# Patient Record
Sex: FEMALE | Race: WHITE | NOT HISPANIC OR LATINO | Employment: UNEMPLOYED
[De-identification: names, ages, dates, MRNs, and addresses within clinical notes are randomized per-mention and may not be internally consistent; named-entity substitution may affect disease eponyms.]

---

## 2017-01-12 RX ORDER — CLINDAMYCIN PHOSPHATE 10 MG/G
GEL TOPICAL
Qty: 30 G | Refills: 0 | OUTPATIENT
Start: 2017-01-12

## 2017-01-13 RX ORDER — CLINDAMYCIN PHOSPHATE 10 MG/G
GEL TOPICAL 2 TIMES DAILY
Qty: 60 G | Refills: 1 | Status: SHIPPED | OUTPATIENT
Start: 2017-01-13 | End: 2021-11-12 | Stop reason: ALTCHOICE

## 2017-02-27 ENCOUNTER — LAB SERVICES (OUTPATIENT)
Dept: OTHER | Age: 37
End: 2017-02-27

## 2017-02-27 ENCOUNTER — CHARTING TRANS (OUTPATIENT)
Dept: OTHER | Age: 37
End: 2017-02-27

## 2017-03-28 ENCOUNTER — CHARTING TRANS (OUTPATIENT)
Dept: OTHER | Age: 37
End: 2017-03-28

## 2017-03-29 LAB — APPEARANCE SPEC: NORMAL

## 2018-01-16 RX ORDER — CLINDAMYCIN PHOSPHATE 10 MG/G
GEL TOPICAL
Qty: 60 G | Refills: 0 | OUTPATIENT
Start: 2018-01-16

## 2018-05-03 ENCOUNTER — TELEPHONE (OUTPATIENT)
Dept: INTERNAL MEDICINE CLINIC | Facility: CLINIC | Age: 38
End: 2018-05-03

## 2018-12-01 ENCOUNTER — IMAGING SERVICES (OUTPATIENT)
Dept: OTHER | Age: 38
End: 2018-12-01

## 2019-04-17 ENCOUNTER — EXTERNAL RECORD (OUTPATIENT)
Dept: OTHER | Age: 39
End: 2019-04-17

## 2019-04-19 LAB — TEXT: NORMAL

## 2021-11-12 PROBLEM — K12.0 APHTHOUS ULCER OF TONGUE: Status: ACTIVE | Noted: 2021-11-12

## 2021-11-23 ENCOUNTER — OFFICE VISIT (OUTPATIENT)
Dept: FAMILY MEDICINE CLINIC | Facility: CLINIC | Age: 41
End: 2021-11-23
Payer: COMMERCIAL

## 2021-11-23 VITALS — TEMPERATURE: 98 F

## 2021-11-23 DIAGNOSIS — Z11.1 SCREENING FOR TUBERCULOSIS: ICD-10-CM

## 2021-11-23 DIAGNOSIS — Z23 NEED FOR VACCINATION: ICD-10-CM

## 2021-11-23 PROCEDURE — 90471 IMMUNIZATION ADMIN: CPT | Performed by: NURSE PRACTITIONER

## 2021-11-23 PROCEDURE — 90707 MMR VACCINE SC: CPT | Performed by: NURSE PRACTITIONER

## 2021-11-23 PROCEDURE — 90746 HEPB VACCINE 3 DOSE ADULT IM: CPT | Performed by: NURSE PRACTITIONER

## 2021-11-23 PROCEDURE — 90715 TDAP VACCINE 7 YRS/> IM: CPT | Performed by: NURSE PRACTITIONER

## 2021-11-23 PROCEDURE — 90472 IMMUNIZATION ADMIN EACH ADD: CPT | Performed by: NURSE PRACTITIONER

## 2021-11-23 PROCEDURE — 90686 IIV4 VACC NO PRSV 0.5 ML IM: CPT | Performed by: NURSE PRACTITIONER

## 2021-11-23 PROCEDURE — 86580 TB INTRADERMAL TEST: CPT | Performed by: NURSE PRACTITIONER

## 2021-11-23 NOTE — PATIENT INSTRUCTIONS
You will need to return to clinic in 48-72 hours to have results of TB test read. Please return to clinic on 11/26 between 8 am and 4:45 pm to have your TB test read. If you do not return in this time frame your test will need to be repeated.     Our

## 2021-11-23 NOTE — PROGRESS NOTES
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 No record of vaccines, [place]. VIS provided for all immunizations, consent given and questions answered. Patient tolerated procedure well with no adverse effects.  Advised

## 2021-11-26 ENCOUNTER — APPOINTMENT (OUTPATIENT)
Dept: FAMILY MEDICINE CLINIC | Facility: CLINIC | Age: 41
End: 2021-11-26
Payer: COMMERCIAL

## 2021-12-06 PROBLEM — K12.0 APHTHOUS ULCER OF TONGUE: Status: RESOLVED | Noted: 2021-11-12 | Resolved: 2021-12-06

## 2021-12-06 PROBLEM — Z00.00: Status: ACTIVE | Noted: 2021-12-06

## 2021-12-07 ENCOUNTER — LAB SERVICES (OUTPATIENT)
Dept: LAB | Age: 41
End: 2021-12-07

## 2021-12-07 DIAGNOSIS — Z01.84 IMMUNITY STATUS TESTING: ICD-10-CM

## 2021-12-07 PROCEDURE — 86787 VARICELLA-ZOSTER ANTIBODY: CPT | Performed by: CLINICAL MEDICAL LABORATORY

## 2021-12-07 PROCEDURE — 36415 COLL VENOUS BLD VENIPUNCTURE: CPT | Performed by: PSYCHIATRY & NEUROLOGY

## 2021-12-09 LAB — VZV IGG SER IA-ACNC: NORMAL

## 2021-12-28 ENCOUNTER — OFFICE VISIT (OUTPATIENT)
Dept: FAMILY MEDICINE CLINIC | Facility: CLINIC | Age: 41
End: 2021-12-28

## 2021-12-28 DIAGNOSIS — Z02.9 ADMINISTRATIVE ENCOUNTER: Primary | ICD-10-CM

## 2022-02-10 ENCOUNTER — LAB SERVICES (OUTPATIENT)
Dept: LAB | Age: 42
End: 2022-02-10

## 2022-02-10 DIAGNOSIS — Z11.1 SCREENING EXAMINATION FOR PULMONARY TUBERCULOSIS: ICD-10-CM

## 2022-02-10 PROCEDURE — 36415 COLL VENOUS BLD VENIPUNCTURE: CPT | Performed by: PSYCHIATRY & NEUROLOGY

## 2022-02-10 PROCEDURE — 86480 TB TEST CELL IMMUN MEASURE: CPT | Performed by: CLINICAL MEDICAL LABORATORY

## 2022-02-12 LAB
GAMMA INTERFERON BACKGROUND BLD IA-ACNC: 0.03 IU/ML
M TB IFN-G BLD-IMP: NEGATIVE
M TB IFN-G CD4+ BCKGRND COR BLD-ACNC: 0 IU/ML
M TB IFN-G CD4+CD8+ BCKGRND COR BLD-ACNC: 0 IU/ML
MITOGEN IGNF BCKGRD COR BLD-ACNC: >10 IU/ML

## 2022-03-29 PROBLEM — B35.1 DERMATOPHYTOSIS OF NAIL: Status: ACTIVE | Noted: 2022-03-29

## 2022-08-26 PROBLEM — J06.9 VIRAL UPPER RESPIRATORY TRACT INFECTION: Status: ACTIVE | Noted: 2022-08-26

## 2023-02-20 PROBLEM — R74.01 ELEVATED ALANINE AMINOTRANSFERASE (ALT) LEVEL: Status: ACTIVE | Noted: 2023-02-20

## 2023-11-07 ENCOUNTER — OFFICE VISIT (OUTPATIENT)
Dept: OCCUPATIONAL MEDICINE | Age: 43
End: 2023-11-07
Attending: PHYSICIAN ASSISTANT

## 2023-11-22 ENCOUNTER — OFFICE VISIT (OUTPATIENT)
Dept: OCCUPATIONAL MEDICINE | Age: 43
End: 2023-11-22
Attending: PHYSICIAN ASSISTANT

## 2023-11-22 DIAGNOSIS — Z01.84 IMMUNITY STATUS TESTING: Primary | ICD-10-CM

## 2023-11-22 LAB
RUBV IGG SER QL: POSITIVE
RUBV IGG SER-ACNC: 274 IU/ML (ref 10–?)

## 2023-11-22 PROCEDURE — 86480 TB TEST CELL IMMUN MEASURE: CPT

## 2023-11-22 PROCEDURE — 86765 RUBEOLA ANTIBODY: CPT

## 2023-11-22 PROCEDURE — 86735 MUMPS ANTIBODY: CPT

## 2023-11-22 PROCEDURE — 86787 VARICELLA-ZOSTER ANTIBODY: CPT

## 2023-11-22 PROCEDURE — 86762 RUBELLA ANTIBODY: CPT

## 2023-11-24 LAB
M TB IFN-G CD4+ T-CELLS BLD-ACNC: 0 IU/ML
M TB TUBERC IFN-G BLD QL: NEGATIVE
M TB TUBERC IGNF/MITOGEN IGNF CONTROL: >10 IU/ML
MEV IGG SER-ACNC: >300 AU/ML (ref 16.5–?)
MUV IGG SER IA-ACNC: 33.1 AU/ML (ref 11–?)
QFT TB1 AG MINUS NIL: 0 IU/ML
QFT TB2 AG MINUS NIL: 0 IU/ML
VZV IGG SER IA-ACNC: 391.3 (ref 165–?)

## 2023-12-26 ENCOUNTER — OFFICE VISIT (OUTPATIENT)
Dept: OCCUPATIONAL MEDICINE | Age: 43
End: 2023-12-26
Attending: PHYSICIAN ASSISTANT

## 2023-12-26 DIAGNOSIS — Z02.1 ENCOUNTER FOR PRE-EMPLOYMENT HEALTH SCREENING EXAMINATION: Primary | ICD-10-CM

## 2023-12-26 LAB
HBV SURFACE AB SER QL: REACTIVE
HBV SURFACE AB SERPL IA-ACNC: >1000 MIU/ML

## 2023-12-26 PROCEDURE — 86706 HEP B SURFACE ANTIBODY: CPT

## 2024-02-17 ENCOUNTER — HOSPITAL ENCOUNTER (OUTPATIENT)
Age: 44
Discharge: HOME | End: 2024-02-17
Payer: COMMERCIAL

## 2024-02-17 VITALS
SYSTOLIC BLOOD PRESSURE: 112 MMHG | RESPIRATION RATE: 18 BRPM | TEMPERATURE: 98 F | OXYGEN SATURATION: 100 % | DIASTOLIC BLOOD PRESSURE: 74 MMHG | HEART RATE: 47 BPM

## 2024-02-17 DIAGNOSIS — H10.31 ACUTE BACTERIAL CONJUNCTIVITIS OF RIGHT EYE: Primary | ICD-10-CM

## 2024-02-17 RX ORDER — POLYMYXIN B SULFATE AND TRIMETHOPRIM 1; 10000 MG/ML; [USP'U]/ML
1 SOLUTION OPHTHALMIC
Qty: 10 ML | Refills: 1 | Status: SHIPPED | OUTPATIENT
Start: 2024-02-17 | End: 2024-02-22

## 2024-02-17 NOTE — DISCHARGE INSTRUCTIONS
Rest    Frequent hand washing    See ophthalmologist if worsening or no improvement in 48-72 hours    Start antibiotics eye drops right away  Warm moist compresses to the eye to remove pustular drainage    Close follow-up with ophthalmology in the next 3-5 days if not better or sooner if worsening.

## 2024-02-17 NOTE — ED INITIAL ASSESSMENT (HPI)
Pt c/o right eye redness and discharge, symptoms started last night; Pt noted some symptoms in left eye this morning

## 2024-02-17 NOTE — ED PROVIDER NOTES
Patient Seen in: [facility]      History     Chief Complaint   Patient presents with    Eye Problem     Stated Complaint: Red eye    Subjective:   HPI    43-year-old female who comes in today with history of bradycardia complaining of right crusting redness and itching.  Patient states that son recently had conjunctivitis.  Patient does not wear contacts or glasses.  Patient denies visual disturbances.    Objective:   Past Medical History:   Diagnosis Date    Hospitalism     none              Past Surgical History:   Procedure Laterality Date    OTHER SURGICAL HISTORY      none                Social History     Socioeconomic History    Marital status:     Number of children: 2   Occupational History    Occupation: homemaker   Tobacco Use    Smoking status: Never    Smokeless tobacco: Never   Substance and Sexual Activity    Alcohol use: No     Alcohol/week: 0.0 standard drinks of alcohol    Drug use: No    Sexual activity: Yes     Partners: Male              Review of Systems    Positive for stated complaint: Red eye  Other systems are as noted in HPI.  Constitutional and vital signs reviewed.      All other systems reviewed and negative except as noted above.    Physical Exam     ED Triage Vitals [02/17/24 1458]   /74   Pulse (!) 43   Resp 18   Temp 98.1 °F (36.7 °C)   Temp src Temporal   SpO2 100 %   O2 Device None (Room air)       Current:/74   Pulse (!) 47   Temp 98.1 °F (36.7 °C) (Temporal)   Resp 18   SpO2 100%     Right Eye Chart Acuity: 20/20, Corrected  Left Eye Chart Acuity: 20/20, Corrected    Physical Exam      General Appearance: Alert, cooperative, no SOB or labored breathing, appropriate for age   Head: Normocephalic, without obvious abnormality   Eyes: EOMI without nystagmus. PERRLA. RIGHT eye: mild conjunctival injection.  No obvious foreign body. No surrounding erythema or edema.       Ears: Bilateral:TM clear and pearly gray color. No external auditory  canal edema or discharge. No pinna, tragus, or mastoid TTP.  Nose: Nares symmetrical, No maxillary or frontal sinus tenderness   Throat: Lips, tongue, and mucosa are moist, pink, and intact; teeth intact. No erythema, edema, exudates of posterior pharynx. Uvula midline, palate symmetrical  Neck: Supple; no lymphadenopathy   Skin:  No rashes.          ED Course   Labs Reviewed - No data to display       MDM          Medical Decision Making    43-year-old female who comes in today with history of bradycardia complaining of right crusting redness and itching.  Patient states that son recently had conjunctivitis.  Patient does not wear contacts or glasses.  Patient denies visual disturbances.    Problems Addressed:  Acute bacterial conjunctivitis of right eye: acute illness or injury    Risk  OTC drugs.  Prescription drug management.  Risk Details: Clinical diagnosis: Acute conjunctivitis    Differential diagnosis includes viral conjunctivitis, bacterial conjunctivitis, orbital cellulitis    Patient has no surrounding erythema, has purulent yellow drainage, will start on  antibiotic drops, warm compresses good handwashing, if no improvement be re-evaluated in 48 hours        Disposition and Plan     Clinical Impression:  1. Acute bacterial conjunctivitis of right eye         Disposition:  Discharge  2/17/2024  3:21 pm    Follow-up:  Hailey Alvarez MD  [address] [facility] [address]  [place] [address]  [phone]          Julienne Barton MD  [address] [place] [address]  [phone]    Schedule an appointment as soon as possible for a visit   If symptoms worsen          Medications Prescribed:  Discharge Medication List as of 2/17/2024  3:24 PM        START taking these medications    Details   polymyxin B-trimethoprim 49839-7.1 UNIT/ML-% Ophthalmic Solution Apply 1 drop to eye Q3H While Awake for 5 days., Normal, Disp-10 mL, R-1

## 2024-04-05 ENCOUNTER — APPOINTMENT (OUTPATIENT)
Dept: GENERAL RADIOLOGY | Age: 44
End: 2024-04-05
Attending: PHYSICIAN ASSISTANT

## 2024-04-05 ENCOUNTER — HOSPITAL ENCOUNTER (OUTPATIENT)
Age: 44
Discharge: HOME | End: 2024-04-05

## 2024-04-05 VITALS
TEMPERATURE: 99 F | HEART RATE: 49 BPM | RESPIRATION RATE: 16 BRPM | SYSTOLIC BLOOD PRESSURE: 90 MMHG | OXYGEN SATURATION: 100 % | WEIGHT: 101.44 LBS | DIASTOLIC BLOOD PRESSURE: 59 MMHG

## 2024-04-05 DIAGNOSIS — R07.0 THROAT PAIN: Primary | ICD-10-CM

## 2024-04-05 DIAGNOSIS — J02.9 PHARYNGITIS, UNSPECIFIED ETIOLOGY: ICD-10-CM

## 2024-04-05 LAB
#MXD IC: 0.3 X10ˆ3/UL (ref 0.1–1)
BUN BLD-MCNC: 10 MG/DL (ref 7–18)
CHLORIDE BLD-SCNC: 106 MMOL/L (ref 98–112)
CO2 BLD-SCNC: 29 MMOL/L (ref 21–32)
CREAT BLD-MCNC: 0.7 MG/DL
EGFRCR SERPLBLD CKD-EPI 2021: 111 ML/MIN/1.73M2 (ref 60–?)
GLUCOSE BLD-MCNC: 66 MG/DL (ref 70–99)
HCT VFR BLD AUTO: 35 %
HCT VFR BLD CALC: 35 %
HGB BLD-MCNC: 11.6 G/DL
ISTAT IONIZED CALCIUM FOR CHEM 8: 1.19 MMOL/L (ref 1.12–1.32)
LYMPHOCYTES # BLD AUTO: 1.7 X10ˆ3/UL (ref 1–4)
LYMPHOCYTES NFR BLD AUTO: 29.8 %
MCH RBC QN AUTO: 32.9 PG (ref 26–34)
MCHC RBC AUTO-ENTMCNC: 33.1 G/DL (ref 31–37)
MCV RBC AUTO: 99.2 FL (ref 80–100)
MIXED CELL %: 5.3 %
NEUTROPHILS # BLD AUTO: 3.6 X10ˆ3/UL (ref 1.5–7.7)
NEUTROPHILS NFR BLD AUTO: 64.9 %
PLATELET # BLD AUTO: 195 X10ˆ3/UL (ref 150–450)
POCT INFLUENZA A: NEGATIVE
POCT INFLUENZA B: NEGATIVE
POTASSIUM BLD-SCNC: 3.4 MMOL/L (ref 3.6–5.1)
RBC # BLD AUTO: 3.53 X10ˆ6/UL
S PYO AG THROAT QL: NEGATIVE
SARS-COV-2 RNA RESP QL NAA+PROBE: NOT DETECTED
SODIUM BLD-SCNC: 145 MMOL/L (ref 136–145)
TROPONIN I BLD-MCNC: <0.02 NG/ML
WBC # BLD AUTO: 5.6 X10ˆ3/UL (ref 4–11)

## 2024-04-05 PROCEDURE — U0002 COVID-19 LAB TEST NON-CDC: HCPCS | Performed by: PHYSICIAN ASSISTANT

## 2024-04-05 PROCEDURE — 84484 ASSAY OF TROPONIN QUANT: CPT | Performed by: PHYSICIAN ASSISTANT

## 2024-04-05 PROCEDURE — 80047 BASIC METABLC PNL IONIZED CA: CPT | Performed by: PHYSICIAN ASSISTANT

## 2024-04-05 PROCEDURE — 87880 STREP A ASSAY W/OPTIC: CPT | Performed by: PHYSICIAN ASSISTANT

## 2024-04-05 PROCEDURE — 93000 ELECTROCARDIOGRAM COMPLETE: CPT | Performed by: PHYSICIAN ASSISTANT

## 2024-04-05 PROCEDURE — 87502 INFLUENZA DNA AMP PROBE: CPT | Performed by: PHYSICIAN ASSISTANT

## 2024-04-05 PROCEDURE — 99214 OFFICE O/P EST MOD 30 MIN: CPT | Performed by: PHYSICIAN ASSISTANT

## 2024-04-05 PROCEDURE — 71046 X-RAY EXAM CHEST 2 VIEWS: CPT | Performed by: PHYSICIAN ASSISTANT

## 2024-04-05 PROCEDURE — 85025 COMPLETE CBC W/AUTO DIFF WBC: CPT | Performed by: PHYSICIAN ASSISTANT

## 2024-04-05 RX ORDER — FAMOTIDINE 20 MG/1
20 TABLET, FILM COATED ORAL 2 TIMES DAILY PRN
Qty: 30 TABLET | Refills: 0 | Status: SHIPPED | OUTPATIENT
Start: 2024-04-05 | End: 2024-05-05

## 2024-04-05 RX ORDER — FLUTICASONE PROPIONATE 50 MCG
2 SPRAY, SUSPENSION (ML) NASAL DAILY
Qty: 16 G | Refills: 0 | Status: SHIPPED | OUTPATIENT
Start: 2024-04-05 | End: 2024-05-05

## 2024-04-05 RX ORDER — CETIRIZINE HYDROCHLORIDE 10 MG/1
10 TABLET ORAL DAILY
Qty: 30 TABLET | Refills: 0 | Status: SHIPPED | OUTPATIENT
Start: 2024-04-05 | End: 2024-05-05

## 2024-04-05 NOTE — ED PROVIDER NOTES
Patient Seen in: [facility] [place]      History     Chief Complaint   Patient presents with    Pain     Stated Complaint: tightness in throat    Subjective:   HPI  Iraida De Paz is a 43 year old female  presents with throat pain x 5 days. Patient reports mild dysphagia to solids, non productive cough, voice hoarseness.  Tolerating oral fluids.    Patient denies ear pain, rhinorrhea, fevers, chills, chest pain/ pressure, GOMEZ, orthopnea,shortness of breath, respiratory distress, stridor, neck pain/ stiffness, headache, eye pain/ redness, facial/ lip/ eyelid swelling. No medications taken prior to arrival. No alleviating/ aggravating factors. Pain 3/10      Objective:   Past Medical History:   Diagnosis Date    Hospitalism     none              Past Surgical History:   Procedure Laterality Date    OTHER SURGICAL HISTORY      none                Social History     Socioeconomic History    Marital status:     Number of children: 2   Occupational History    Occupation: homemaker   Tobacco Use    Smoking status: Never    Smokeless tobacco: Never   Substance and Sexual Activity    Alcohol use: No     Alcohol/week: 0.0 standard drinks of alcohol    Drug use: No    Sexual activity: Yes     Partners: Male              Review of Systems   All other systems reviewed and are negative.      Positive for stated complaint: tightness in throat  Other systems are as noted in HPI.  Constitutional and vital signs reviewed.      All other systems reviewed and negative except as noted above.    Physical Exam     ED Triage Vitals [04/05/24 1630]   /80   Pulse 60   Resp 16   Temp 98.6 °F (37 °C)   Temp src Temporal   SpO2 98 %   O2 Device None (Room air)       Current:BP 90/59   Pulse (!) 49   Temp 98.6 °F (37 °C) (Temporal)   Resp 16   Wt 46 kg   LMP 04/01/2024 (Approximate)   SpO2 100%         Physical Exam  Vitals and nursing note reviewed.   Constitutional:       General: She is not in acute  distress.     Appearance: Normal appearance. She is normal weight. She is not ill-appearing, toxic-appearing or diaphoretic.   HENT:      Head: Normocephalic and atraumatic.      Right Ear: Tympanic membrane, ear canal and external ear normal.      Left Ear: Tympanic membrane, ear canal and external ear normal.      Nose: No congestion or rhinorrhea.      Mouth/Throat:      Mouth: Mucous membranes are moist.      Pharynx: Oropharynx is clear. No oropharyngeal exudate or posterior oropharyngeal erythema.   Eyes:      Extraocular Movements: Extraocular movements intact.      Conjunctiva/sclera: Conjunctivae normal.      Pupils: Pupils are equal, round, and reactive to light.   Pulmonary:      Effort: Pulmonary effort is normal. No respiratory distress.      Breath sounds: No stridor. No wheezing, rhonchi or rales.   Chest:      Chest wall: No tenderness.   Musculoskeletal:         General: No swelling, tenderness, deformity or signs of injury. Normal range of motion.      Cervical back: Normal range of motion and neck supple.   Skin:     General: Skin is warm and dry.      Capillary Refill: Capillary refill takes less than 2 seconds.      Coloration: Skin is not jaundiced or pale.      Findings: No bruising, erythema, lesion or rash.   Neurological:      General: No focal deficit present.      Mental Status: She is alert and oriented to person, place, and time. Mental status is at baseline.   Psychiatric:         Mood and Affect: Mood normal.         Behavior: Behavior normal.               ED Course     Labs Reviewed   POCT CBC - Abnormal; Notable for the following components:       Result Value    RBC IC 3.53 (*)     HGB IC 11.6 (*)     All other components within normal limits   POCT ISTAT CHEM8 CARTRIDGE - Abnormal; Notable for the following components:    ISTAT Potassium 3.4 (*)     ISTAT Glucose 66 (*)     All other components within normal limits   POCT RAPID STREP - Normal   ISTAT TROPONIN - Normal   POCT FLU  TEST - Normal    Narrative:     This assay is a rapid molecular in vitro test utilizing nucleic acid amplification of influenza A and B viral RNA.   RAPID SARS-COV-2 BY PCR - Normal     Results for orders placed or performed during the hospital encounter of 04/05/24   POCT Flu Test    Collection Time: 04/05/24  4:45 PM    Specimen: Nares; Other   Result Value Ref Range    POCT INFLUENZA A Negative Negative    POCT INFLUENZA B Negative Negative   Rapid SARS-CoV-2 by PCR    Collection Time: 04/05/24  4:45 PM    Specimen: Nares; Other   Result Value Ref Range    Rapid SARS-CoV-2 by PCR Not Detected Not Detected   POCT CBC    Collection Time: 04/05/24  4:46 PM   Result Value Ref Range    WBC IC 5.6 4.0 - 11.0 x10ˆ3/uL    RBC IC 3.53 (L) 3.80 - 5.30 X10ˆ6/uL    HGB IC 11.6 (L) 12.0 - 16.0 g/dL    HCT IC 35.0 35.0 - 48.0 %    MCV IC 99.2 80.0 - 100.0 fL    MCH IC 32.9 26.0 - 34.0 pg    MCHC IC 33.1 31.0 - 37.0 g/dL    PLT .0 150.0 - 450.0 X10ˆ3/uL    # Neutrophil 3.6 1.5 - 7.7 X10ˆ3/uL    # Lymphocyte 1.7 1.0 - 4.0 X10ˆ3/uL    # Mixed Cells 0.3 0.1 - 1.0 X10ˆ3/uL    Neutrophil % 64.9 %    Lymphocyte % 29.8 %    Mixed Cell % 5.3 %   EKG    Collection Time: 04/05/24  4:57 PM   Result Value Ref Range    Ventricular rate 43 BPM    Atrial rate 43 BPM    P-R Interval 162 ms    QRS Duration 78 ms    Q-T Interval 422 ms    QTC Calculation (Bezet) 356 ms    P Axis 41 degrees    R Axis 71 degrees    T Axis 50 degrees   POCT Rapid Strep    Collection Time: 04/05/24  5:00 PM   Result Value Ref Range    POCT Rapid Strep Negative Negative   POCT ISTAT chem8 cartridge    Collection Time: 04/05/24  5:00 PM   Result Value Ref Range    ISTAT Sodium 145 136 - 145 mmol/L    ISTAT BUN 10 7 - 18 mg/dL    ISTAT Potassium 3.4 (L) 3.6 - 5.1 mmol/L    ISTAT Chloride 106 98 - 112 mmol/L    ISTAT Ionized Calcium 1.19 1.12 - 1.32 mmol/L    ISTAT Hematocrit 35 34 - 50 %    ISTAT Glucose 66 (L) 70 - 99 mg/dL    ISTAT TCO2 29 21 - 32 mmol/L     ISTAT Creatinine 0.70 0.55 - 1.02 mg/dL    eGFR-Cr 111 >=60 mL/min/1.73m2   ISTAT Troponin    Collection Time: 04/05/24  5:03 PM   Result Value Ref Range    ISTAT Troponin <0.02 <0.045 ng/mL ng/mL     Vitals:    04/05/24 1742   BP: 90/59   Pulse: (!) 49   Resp: 16   Temp:      Medications - No data to display  XR CHEST PA + LAT CHEST (CPT=71046)   Final Result   PROCEDURE:  XR CHEST PA + LAT CHEST (CPT=71046)       INDICATIONS:  cough.  rule out pneumonia       COMPARISON:  None.       TECHNIQUE:  PA and lateral chest radiographs were obtained.       PATIENT STATED HISTORY: (As transcribed by Technologist)  Throat pain that    is worse when swallowing for 2 weeks. Chest tightness when taking a deep    breath.            FINDINGS:     Cardiac size and pulmonary vasculature are within normal limits. No    pleural effusions. No pneumothorax.                          =====   CONCLUSION:  No acute pulmonary findings.           LOCATION:  [facility]           Dictated by (CST): Aleta Redmond MD on 4/05/2024 at 5:25 PM        Finalized by (CST): Aleta Redmond MD on 4/05/2024 at 5:25 PM             EKG    Rate, intervals and axes as noted on EKG Report.  Rate: 43 bpm  Rhythm: Sinus Rhythm  Reading: normal axis.  No QT prolongation.  No ST segment elevation/ depression               ED Course as of 04/05/24 1818  ------------------------------------------------------------  Time: 04/05 1712  Value: POCT CBC(!)  Comment: Hgb- 11.6 otherwise no evidence of leukocytosis  ------------------------------------------------------------  Time: 04/05 1712  Comment: Potassium 3.4.  glucose 66 otherwise no electrolyte abnormalities   ------------------------------------------------------------  Time: 04/05 1713  Comment: se  ------------------------------------------------------------  Time: 04/05 1714  Value: POCT Rapid Strep  Comment: Negative     ------------------------------------------------------------  Time: 04/05 1714  Value:  Rapid SARS-CoV-2 by PCR  Comment: Negative   ------------------------------------------------------------  Time: 04/05 1714  Value: POCT Flu Test  Comment: Negative               [facility]                                        Medical Decision Making  43-year-old well-appearing female presents with acute onset of symptoms and reported scratchy throat associated hoarseness.  Considerations include but not limited to GERD versus allergic rhinitis versus viral URI  Plan  -Labs: CBC i-STAT Chem-8, strep swab, troponin, COVID-19  -EKG  -Chest x-ray PA/lateral  -Discharge home  -Rx: Pepcid 20 mg p.o. twice daily.  Zyrtec p.o. daily.  Flonase 2 sprays bilateral nostrils twice daily.  -Advised patient to follow-up with either PCP or ENT for further evaluation.    Amount and/or Complexity of Data Reviewed  Labs: ordered. Decision-making details documented in ED Course.  Radiology: ordered and independent interpretation performed. Decision-making details documented in ED Course.     Details: NO Cardiopulmonary abnormality noted on chest x-ray based on my independent interpretation        Disposition and Plan     Clinical Impression:  1. Throat pain    2. Pharyngitis, unspecified etiology         Disposition:  Discharge  4/5/2024  5:36 pm    Follow-up:  Hailey Alvarez MD  [address] [facility] [address]  [place] [address]  [phone]          Jeff Segal MD  [address] [place] [address]  [phone]                Medications Prescribed:  Discharge Medication List as of 4/5/2024  5:37 PM        START taking these medications    Details   fluticasone propionate 50 MCG/ACT Nasal Suspension 2 sprays by Nasal route daily., Normal, Disp-16 g, R-0      cetirizine 10 MG Oral Tab Take 1 tablet (10 mg total) by mouth daily., Normal, Disp-30 tablet, R-0      famotidine (PEPCID) 20 MG Oral Tab Take 1 tablet (20 mg total) by mouth 2 (two) times daily as needed for Heartburn., Normal, Disp-30 tablet, R-0

## 2024-04-05 NOTE — ED INITIAL ASSESSMENT (HPI)
Throat pain worse when swallows, talks onset 2 weeks.  Also says chest feels tight when takes a deep breath or swallows.  Says does not feel like typical sore throat.  Denies recent travel or flu like s./s.

## 2024-04-06 LAB
ATRIAL RATE: 43 BPM
P AXIS: 41 DEGREES
P-R INTERVAL: 162 MS
Q-T INTERVAL: 422 MS
QRS DURATION: 78 MS
QTC CALCULATION (BEZET): 356 MS
R AXIS: 71 DEGREES
T AXIS: 50 DEGREES
VENTRICULAR RATE: 43 BPM

## 2024-09-16 ENCOUNTER — OFFICE VISIT (OUTPATIENT)
Dept: OCCUPATIONAL MEDICINE | Age: 44
End: 2024-09-16
Attending: FAMILY MEDICINE

## 2024-09-16 DIAGNOSIS — Z02.89 VISIT FOR OCCUPATIONAL HEALTH EXAMINATION: Primary | ICD-10-CM

## 2024-09-16 PROCEDURE — 86480 TB TEST CELL IMMUN MEASURE: CPT

## 2024-09-18 LAB
M TB IFN-G CD4+ T-CELLS BLD-ACNC: 0 IU/ML
M TB TUBERC IFN-G BLD QL: NEGATIVE
M TB TUBERC IGNF/MITOGEN IGNF CONTROL: >10 IU/ML
QFT TB1 AG MINUS NIL: 0.01 IU/ML
QFT TB2 AG MINUS NIL: 0 IU/ML

## (undated) NOTE — LETTER
November 26, 2021    [facility]      Dear Carrie Dugan: The following are the results of your recent tests. Please review the list of test results.   Your result is the value on the left; we have also supplied